# Patient Record
(demographics unavailable — no encounter records)

---

## 2025-04-02 NOTE — HISTORY OF PRESENT ILLNESS
[FreeTextEntry1] : Ms. Jordan is a 66 year old female who is here for an evaluation of her symptomatic hemorrhoids. She was referred to me by Dr. Jacquelyn Frankel. Pt reports that she feels something coming out with bowel movements and has BRBPR on the toilet paper while wiping.   Her last colonoscopy was in 7/2022, which showed diverticulosis of the sigmoid colon

## 2025-04-02 NOTE — PHYSICAL EXAM
[Abdomen Masses] : No abdominal masses [Abdomen Tenderness] : ~T No ~M abdominal tenderness [Tender, Swollen] : tender, swollen [Skin Tags] : residual hemorrhoidal skin tags were noted [de-identified] : grade 2

## 2025-04-02 NOTE — ASSESSMENT
[FreeTextEntry1] : Ms. MENDEZ is a 66 year female who presents for symptomatic hemorrhoids.    Discussed management options for hemorrhoids including medical management with fiber supplements, increased fluid intake, and symptom management during acute flares, office based procedures including rubber band ligation, transanal hemorrhoidal dearterialization, and surgical excision.   Given the findings today on exam recommend starting with medical management of hemorrhoids. Recommend daily fiber supplement such as Metamucil with 1 scoop in a glass of water in the morning. Daily fiber intake recommendation is 30 gm. Recommend 6-8 glasses of noncaffeinated beverage daily to help with constipation symptoms.   If symptoms do not improve or return recommend follow up in 4-6 weeks.

## 2025-04-02 NOTE — PHYSICAL EXAM
[Abdomen Masses] : No abdominal masses [Abdomen Tenderness] : ~T No ~M abdominal tenderness [Tender, Swollen] : tender, swollen [Skin Tags] : residual hemorrhoidal skin tags were noted [de-identified] : grade 2

## 2025-04-02 NOTE — PHYSICAL EXAM
[Abdomen Masses] : No abdominal masses [Abdomen Tenderness] : ~T No ~M abdominal tenderness [Tender, Swollen] : tender, swollen [Skin Tags] : residual hemorrhoidal skin tags were noted [de-identified] : grade 2

## 2025-04-29 NOTE — HISTORY OF PRESENT ILLNESS
[FreeTextEntry1] : Ms. Jordan is a 66 year old female who is here for a follow up on her symptomatic hemorrhoids. Medical management recommended at last visit on 4/1/25.